# Patient Record
Sex: MALE | Race: WHITE | NOT HISPANIC OR LATINO | Employment: FULL TIME | URBAN - METROPOLITAN AREA
[De-identification: names, ages, dates, MRNs, and addresses within clinical notes are randomized per-mention and may not be internally consistent; named-entity substitution may affect disease eponyms.]

---

## 2017-04-20 ENCOUNTER — OFFICE VISIT (OUTPATIENT)
Dept: OPTOMETRY | Facility: CLINIC | Age: 47
End: 2017-04-20
Payer: COMMERCIAL

## 2017-04-20 DIAGNOSIS — H43.393 VITREOUS FLOATERS, BILATERAL: Primary | ICD-10-CM

## 2017-04-20 DIAGNOSIS — H52.7 REFRACTIVE ERROR: ICD-10-CM

## 2017-04-20 PROCEDURE — 92014 COMPRE OPH EXAM EST PT 1/>: CPT | Mod: S$GLB,,, | Performed by: OPTOMETRIST

## 2017-04-20 PROCEDURE — 99999 PR PBB SHADOW E&M-EST. PATIENT-LVL II: CPT | Mod: PBBFAC,,, | Performed by: OPTOMETRIST

## 2017-04-20 PROCEDURE — 92015 DETERMINE REFRACTIVE STATE: CPT | Mod: S$GLB,,, | Performed by: OPTOMETRIST

## 2017-04-20 NOTE — MR AVS SNAPSHOT
[facility]  [address]  [place] [address]  Phone: [phone]  Fax: [phone]                  César Darby   2017 1:30 PM   Office Visit    Description:  Male : 1970   Provider:  Jacqueline Spicer OD   Department:  [facility]           Reason for Visit     Concerns About Ocular Health           Diagnoses this Visit        Comments    Vitreous floaters, bilateral    -  Primary     Refractive error                To Do List           Goals (5 Years of Data)     None      Follow-Up and Disposition     Return in about 1 year (around 2018) for Annual eye exam.      [facility]     [facility] Care Line -  Assistance  Unless otherwise directed by your provider, please contact [facility], our nurse care line that is available for  assistance.     Registered nurses in the [facility] provide: appointment scheduling, clinical advisement, health education, and other advisory services.  Call: [phone] (toll free)               Medications                Verify that the below list of medications is an accurate representation of the medications you are currently taking.  If none reported, the list may be blank. If incorrect, please contact your healthcare provider. Carry this list with you in case of emergency.                Clinical Reference Information           Allergies as of 2017     No Known Allergies      Immunizations Administered on Date of Encounter - 2017     None      [facility] Sign-Up     Activating your [facility] account is as easy as 1-2-3!     1) Visit [email or web address], select Sign Up Now, enter this activation code and your date of birth, then select Next.  [email or web address]  Expires: 2017  1:55 PM      2) Create a username and password to use when you visit [facility] in the future and select a security question in case you lose your password and select Next.    3) Enter your e-mail address and click Sign  Up!    Additional Information  If you have questions, please e-mail [email or web address] or call [phone] to talk to our [facility] staff. Remember, [facility] is NOT to be used for urgent needs. For medical emergencies, dial 911.         Language Assistance Services     ATTENTION: Language assistance services are available, free of charge. Please call [phone].      ATENCIÓN: Si habla español, tiene a marin disposición servicios gratuitos de asistencia lingüística. Llame al [phone].     CHÚ Ý: N?u b?n nói Ti?ng Vi?t, có các d?ch v? h? tr? ngôn ng? mi?n phí dành cho b?n. G?i s? [phone].         [facility] complies with applicable Federal civil rights laws and does not discriminate on the basis of race, color, national origin, age, disability, or sex.

## 2017-04-20 NOTE — PROGRESS NOTES
Subjective:       Patient ID: César Darby is a 46 y.o. male      Chief Complaint   Patient presents with    Concerns About Ocular Health     History of Present Illness  HPI     DLS:  6/10/14    Pt here for check of ocular health. Pt states he has been noticing a   decrease in [facility] at near. Pt would like a new Rx for eyeglasses.     (-)flashes, (-)floaters  (-)eye pain, (-)headache  (-)itching, (-)tearing, (-)burning      Assessment/Plan:     1. Vitreous floaters, bilateral  Stable. Monitor.    2. Refractive error  Educated patient on refractive error and discussed lens options. Gave pt Rx for specs. RTC in 1 year.    Eyeglass Final Rx     Eyeglass Final Rx      Sphere Cylinder Add   Right +1.00 Sphere +1.50   Left +0.50 +0.50 +1.50       Type:  PAL    Expiration Date:  4/21/2018                Return in about 1 year (around 4/20/2018) for Annual eye exam.

## 2017-04-28 ENCOUNTER — OFFICE VISIT (OUTPATIENT)
Dept: OPTOMETRY | Facility: CLINIC | Age: 47
End: 2017-04-28
Payer: COMMERCIAL

## 2017-04-28 DIAGNOSIS — H52.7 REFRACTIVE ERROR: Primary | ICD-10-CM

## 2017-04-28 PROCEDURE — 99999 PR PBB SHADOW E&M-EST. PATIENT-LVL I: CPT | Mod: PBBFAC,,, | Performed by: OPTOMETRIST

## 2017-04-28 PROCEDURE — 99499 UNLISTED E&M SERVICE: CPT | Mod: S$GLB,,, | Performed by: OPTOMETRIST

## 2017-04-28 NOTE — PROGRESS NOTES
Subjective:       Patient ID: César Darby is a 46 y.o. male      Chief Complaint   Patient presents with    Other     History of Present Illness  Dls : 4/20/17 Dr. Spicer    Pt here for refraction ck.     Assessment/Plan:     1. Refractive error  Need to update axis for OS.     Eyeglass Final Rx     Eyeglass Final Rx      Sphere Cylinder Axis Add   Right +1.00 Sphere  +1.50   Left +0.50 +0.50 015 +1.50       Type:  PAL    Expiration Date:  4/29/2018

## 2017-04-28 NOTE — MR AVS SNAPSHOT
[facility]  [address]  [place] [address]  Phone: [phone]  Fax: [phone]                  César Darby   2017 4:00 PM   Office Visit    Description:  Male : 1970   Provider:  Jacqueline Spicer OD   Department:  [facility]           Reason for Visit     Other           Diagnoses this Visit        Comments    Refractive error    -  Primary            To Do List           Goals (5 Years of Data)     None      [facility]     [facility] Care [facility] -  Assistance  Unless otherwise directed by your provider, please contact [facility], our nurse care line that is available for  assistance.     Registered nurses in the [facility] provide: appointment scheduling, clinical advisement, health education, and other advisory services.  Call: [phone] (toll free)               Medications                Verify that the below list of medications is an accurate representation of the medications you are currently taking.  If none reported, the list may be blank. If incorrect, please contact your healthcare provider. Carry this list with you in case of emergency.                Clinical Reference Information           Allergies as of 2017     No Known Allergies      Immunizations Administered on Date of Encounter - 2017     None      [facility] Sign-Up     Activating your [facility] account is as easy as 1-2-3!     1) Visit [email or web address], select Sign Up Now, enter this activation code and your date of birth, then select Next.  [email or web address]  Expires: 2017  1:55 PM      2) Create a username and password to use when you visit [facility] in the future and select a security question in case you lose your password and select Next.    3) Enter your e-mail address and click Sign Up!    Additional Information  If you have questions, please e-mail [email or web address] or call [phone] to talk to our [facility] staff. Remember,  [facility] is NOT to be used for urgent needs. For medical emergencies, dial 911.         Language Assistance Services     ATTENTION: Language assistance services are available, free of charge. Please call [phone].      ATENCIÓN: Si habla kiki, tiene a marin disposición servicios gratuitos de asistencia lingüística. Llame al [phone].     CHÚ Ý: N?u b?n nói Ti?ng Vi?t, có các d?ch v? h? tr? ngôn ng? mi?n phí dành cho b?n. G?i s? [phone].         [facility] complies with applicable Federal civil rights laws and does not discriminate on the basis of race, color, national origin, age, disability, or sex.

## 2021-07-22 ENCOUNTER — IMMUNIZATION (OUTPATIENT)
Dept: PRIMARY CARE CLINIC | Facility: CLINIC | Age: 51
End: 2021-07-22
Payer: OTHER GOVERNMENT

## 2021-07-22 DIAGNOSIS — Z23 NEED FOR VACCINATION: Primary | ICD-10-CM

## 2021-07-22 PROCEDURE — 0031A COVID-19,VECTOR-NR,RS-AD26,PF,0.5 ML DOSE VACCINE (JANSSEN): CPT | Mod: PBBFAC | Performed by: FAMILY MEDICINE
